# Patient Record
Sex: FEMALE | Race: BLACK OR AFRICAN AMERICAN | NOT HISPANIC OR LATINO | Employment: FULL TIME | URBAN - METROPOLITAN AREA
[De-identification: names, ages, dates, MRNs, and addresses within clinical notes are randomized per-mention and may not be internally consistent; named-entity substitution may affect disease eponyms.]

---

## 2024-09-11 ENCOUNTER — APPOINTMENT (OUTPATIENT)
Dept: CARDIOLOGY | Facility: HOSPITAL | Age: 54
End: 2024-09-11
Payer: COMMERCIAL

## 2024-09-11 ENCOUNTER — APPOINTMENT (OUTPATIENT)
Dept: RADIOLOGY | Facility: HOSPITAL | Age: 54
End: 2024-09-11
Payer: COMMERCIAL

## 2024-09-11 ENCOUNTER — HOSPITAL ENCOUNTER (EMERGENCY)
Facility: HOSPITAL | Age: 54
Discharge: HOME | End: 2024-09-11
Attending: EMERGENCY MEDICINE
Payer: COMMERCIAL

## 2024-09-11 VITALS
TEMPERATURE: 96.9 F | DIASTOLIC BLOOD PRESSURE: 80 MMHG | RESPIRATION RATE: 18 BRPM | OXYGEN SATURATION: 100 % | SYSTOLIC BLOOD PRESSURE: 135 MMHG | HEART RATE: 73 BPM | BODY MASS INDEX: 34.91 KG/M2 | WEIGHT: 197 LBS | HEIGHT: 63 IN

## 2024-09-11 DIAGNOSIS — R07.9 CHEST PAIN, UNSPECIFIED TYPE: ICD-10-CM

## 2024-09-11 DIAGNOSIS — R42 DIZZINESS: Primary | ICD-10-CM

## 2024-09-11 LAB
ALBUMIN SERPL BCP-MCNC: 4 G/DL (ref 3.4–5)
ALP SERPL-CCNC: 75 U/L (ref 33–110)
ALT SERPL W P-5'-P-CCNC: 9 U/L (ref 7–45)
ANION GAP SERPL CALC-SCNC: 8 MMOL/L (ref 10–20)
AST SERPL W P-5'-P-CCNC: 11 U/L (ref 9–39)
BASOPHILS # BLD AUTO: 0.03 X10*3/UL (ref 0–0.1)
BASOPHILS NFR BLD AUTO: 0.6 %
BILIRUB SERPL-MCNC: 0.4 MG/DL (ref 0–1.2)
BUN SERPL-MCNC: 15 MG/DL (ref 6–23)
CALCIUM SERPL-MCNC: 9.6 MG/DL (ref 8.6–10.3)
CARDIAC TROPONIN I PNL SERPL HS: 3 NG/L (ref 0–13)
CARDIAC TROPONIN I PNL SERPL HS: <3 NG/L (ref 0–13)
CHLORIDE SERPL-SCNC: 105 MMOL/L (ref 98–107)
CO2 SERPL-SCNC: 30 MMOL/L (ref 21–32)
CREAT SERPL-MCNC: 0.53 MG/DL (ref 0.5–1.05)
EGFRCR SERPLBLD CKD-EPI 2021: >90 ML/MIN/1.73M*2
EOSINOPHIL # BLD AUTO: 0 X10*3/UL (ref 0–0.7)
EOSINOPHIL NFR BLD AUTO: 0 %
ERYTHROCYTE [DISTWIDTH] IN BLOOD BY AUTOMATED COUNT: 14.8 % (ref 11.5–14.5)
FLUAV RNA RESP QL NAA+PROBE: NOT DETECTED
FLUBV RNA RESP QL NAA+PROBE: NOT DETECTED
GLUCOSE SERPL-MCNC: 95 MG/DL (ref 74–99)
HCT VFR BLD AUTO: 40.3 % (ref 36–46)
HGB BLD-MCNC: 12.7 G/DL (ref 12–16)
IMM GRANULOCYTES # BLD AUTO: 0.01 X10*3/UL (ref 0–0.7)
IMM GRANULOCYTES NFR BLD AUTO: 0.2 % (ref 0–0.9)
LIPASE SERPL-CCNC: 13 U/L (ref 9–82)
LYMPHOCYTES # BLD AUTO: 1.59 X10*3/UL (ref 1.2–4.8)
LYMPHOCYTES NFR BLD AUTO: 31.9 %
MAGNESIUM SERPL-MCNC: 2 MG/DL (ref 1.6–2.4)
MCH RBC QN AUTO: 28.2 PG (ref 26–34)
MCHC RBC AUTO-ENTMCNC: 31.5 G/DL (ref 32–36)
MCV RBC AUTO: 90 FL (ref 80–100)
MONOCYTES # BLD AUTO: 0.29 X10*3/UL (ref 0.1–1)
MONOCYTES NFR BLD AUTO: 5.8 %
NEUTROPHILS # BLD AUTO: 3.07 X10*3/UL (ref 1.2–7.7)
NEUTROPHILS NFR BLD AUTO: 61.5 %
NRBC BLD-RTO: 0 /100 WBCS (ref 0–0)
PLATELET # BLD AUTO: 340 X10*3/UL (ref 150–450)
POTASSIUM SERPL-SCNC: 3.9 MMOL/L (ref 3.5–5.3)
PROT SERPL-MCNC: 7.6 G/DL (ref 6.4–8.2)
RBC # BLD AUTO: 4.5 X10*6/UL (ref 4–5.2)
SARS-COV-2 RNA RESP QL NAA+PROBE: NOT DETECTED
SODIUM SERPL-SCNC: 139 MMOL/L (ref 136–145)
WBC # BLD AUTO: 5 X10*3/UL (ref 4.4–11.3)

## 2024-09-11 PROCEDURE — 99283 EMERGENCY DEPT VISIT LOW MDM: CPT | Mod: 25

## 2024-09-11 PROCEDURE — 85025 COMPLETE CBC W/AUTO DIFF WBC: CPT

## 2024-09-11 PROCEDURE — 96361 HYDRATE IV INFUSION ADD-ON: CPT

## 2024-09-11 PROCEDURE — 93005 ELECTROCARDIOGRAM TRACING: CPT

## 2024-09-11 PROCEDURE — 71046 X-RAY EXAM CHEST 2 VIEWS: CPT | Mod: FOREIGN READ | Performed by: RADIOLOGY

## 2024-09-11 PROCEDURE — 96360 HYDRATION IV INFUSION INIT: CPT

## 2024-09-11 PROCEDURE — 93005 ELECTROCARDIOGRAM TRACING: CPT | Mod: 59

## 2024-09-11 PROCEDURE — 2500000004 HC RX 250 GENERAL PHARMACY W/ HCPCS (ALT 636 FOR OP/ED)

## 2024-09-11 PROCEDURE — 83690 ASSAY OF LIPASE: CPT

## 2024-09-11 PROCEDURE — 84484 ASSAY OF TROPONIN QUANT: CPT

## 2024-09-11 PROCEDURE — 71046 X-RAY EXAM CHEST 2 VIEWS: CPT

## 2024-09-11 PROCEDURE — 80053 COMPREHEN METABOLIC PANEL: CPT

## 2024-09-11 PROCEDURE — 36415 COLL VENOUS BLD VENIPUNCTURE: CPT

## 2024-09-11 PROCEDURE — 83735 ASSAY OF MAGNESIUM: CPT

## 2024-09-11 PROCEDURE — 84484 ASSAY OF TROPONIN QUANT: CPT | Mod: 91

## 2024-09-11 PROCEDURE — 2500000001 HC RX 250 WO HCPCS SELF ADMINISTERED DRUGS (ALT 637 FOR MEDICARE OP)

## 2024-09-11 PROCEDURE — 87636 SARSCOV2 & INF A&B AMP PRB: CPT

## 2024-09-11 RX ORDER — MECLIZINE HYDROCHLORIDE 25 MG/1
25 TABLET ORAL ONCE
Status: COMPLETED | OUTPATIENT
Start: 2024-09-11 | End: 2024-09-11

## 2024-09-11 RX ORDER — MECLIZINE HYDROCHLORIDE 25 MG/1
25 TABLET ORAL 3 TIMES DAILY PRN
Qty: 15 TABLET | Refills: 0 | Status: SHIPPED | OUTPATIENT
Start: 2024-09-11 | End: 2024-09-18

## 2024-09-11 RX ORDER — ACETAMINOPHEN 325 MG/1
975 TABLET ORAL ONCE
Status: COMPLETED | OUTPATIENT
Start: 2024-09-11 | End: 2024-09-11

## 2024-09-11 ASSESSMENT — PAIN DESCRIPTION - ORIENTATION: ORIENTATION: LEFT;UPPER

## 2024-09-11 ASSESSMENT — PAIN SCALES - GENERAL
PAINLEVEL_OUTOF10: 3
PAINLEVEL_OUTOF10: 4
PAINLEVEL_OUTOF10: 4

## 2024-09-11 ASSESSMENT — PAIN DESCRIPTION - LOCATION
LOCATION: CHEST

## 2024-09-11 ASSESSMENT — PAIN DESCRIPTION - DESCRIPTORS: DESCRIPTORS: DISCOMFORT;TIGHTNESS

## 2024-09-11 ASSESSMENT — COLUMBIA-SUICIDE SEVERITY RATING SCALE - C-SSRS
6. HAVE YOU EVER DONE ANYTHING, STARTED TO DO ANYTHING, OR PREPARED TO DO ANYTHING TO END YOUR LIFE?: NO
1. IN THE PAST MONTH, HAVE YOU WISHED YOU WERE DEAD OR WISHED YOU COULD GO TO SLEEP AND NOT WAKE UP?: NO
2. HAVE YOU ACTUALLY HAD ANY THOUGHTS OF KILLING YOURSELF?: NO

## 2024-09-11 ASSESSMENT — PAIN DESCRIPTION - PAIN TYPE: TYPE: ACUTE PAIN

## 2024-09-11 ASSESSMENT — PAIN - FUNCTIONAL ASSESSMENT: PAIN_FUNCTIONAL_ASSESSMENT: 0-10

## 2024-09-11 NOTE — Clinical Note
Betty SajiJuan was seen and treated in our emergency department on 9/11/2024.  She may return to work on 09/14/2024.       If you have any questions or concerns, please don't hesitate to call.      Nelly John PA-C

## 2024-09-11 NOTE — ED PROVIDER NOTES
HPI   Chief Complaint   Patient presents with    Chest Pain       HPI  HISTORY OF PRESENT ILLNESS:  53 y.o. female presenting to the ED with complaint of dizziness and mild chest pain that began at night.  Patient states that she began feeling dizzy yesterday, she describes it as a room spinning sensation, worse with head movement such as rotation of the head, worse with position changes as well.  Better when she sits still.  She states that she was so dizzy last night that she became nauseous and had 1 episode of emesis.  Not currently feeling nauseous today.  She states the dizziness is somewhat improved today, but still present, and could not go to work today due to the dizziness, so she came to the ED.  She also reports mild bilateral earache, no hearing change, no bleeding or drainage from the ears.  No headache.  No blurry vision or vision loss.  No extremity numbness, tingling, or weakness.  No difficulty with speech or swallowing.  No gait instability.  She also reports mild left upper chest pain, describes it as a mild discomfort, states it is sore and slightly tender.  She states that this pain is nearly resolved today, she states that she think she is anxious and thinking about it too much, but has minimal left upper chest pain that does not radiate.  Is not pleuritic, not worsened with deep breaths.  Not worsened with exertion or activity such as walking distances or climbing stairs.  Not worsened or improved with position changes such as sitting forward or lying back flat.  She denies shortness of breath.  No cough or hemoptysis.  States that she recently was sick for about 2 weeks with nasal congestion, rhinorrhea, and a nonproductive cough, but this improved in the past 5 to 7 days and she no longer has the symptoms.  She denies any fevers or chills.  No headache, neck pain or stiffness.  No abdominal pain.  No diarrhea.  No back or flank pain.  She states that she has chronic swelling of her lower  extremities but is unchanged from baseline.  No lower extremity pain, no calf tenderness.  No medications taken today for her symptoms.  No other complaints or symptoms voiced    12 point review of systems was performed and is negative unless otherwise specified in HPI.    PMH: obesity, chronic peripheral edema  Social history: non smoker, no ETOH, no illicit substances  Physical Exam   ED Triage Vitals [09/11/24 0750]   Temperature Heart Rate Respirations BP   36.1 °C (96.9 °F) 63 18 136/79      Pulse Ox Temp Source Heart Rate Source Patient Position   100 % Temporal -- --      BP Location FiO2 (%)     -- --       Physical Exam  Constitutional:       General: She is not in acute distress.     Appearance: She is not ill-appearing, toxic-appearing or diaphoretic.   HENT:      Head: Normocephalic and atraumatic.      Comments: +dizziness reproduced with head rotation, relatively mild.   Normal bilateral ear canals and tympanic membranes, no cerumen impaction, no erythema or bulging of the tympanic membranes bilaterally, no mastoid tenderness bilaterally.  No nasal congestion or sinus tenderness bilaterally.  No pharyngeal erythema.     Nose: No congestion.      Mouth/Throat:      Mouth: Mucous membranes are moist.   Eyes:      General: No visual field deficit or scleral icterus.     Extraocular Movements: Extraocular movements intact.      Right eye: Normal extraocular motion.      Left eye: Normal extraocular motion.      Pupils: Pupils are equal, round, and reactive to light.   Neck:      Vascular: No JVD.   Cardiovascular:      Rate and Rhythm: Normal rate and regular rhythm.      Pulses: Normal pulses.           Radial pulses are 2+ on the right side and 2+ on the left side.      Heart sounds: Normal heart sounds.   Pulmonary:      Effort: Pulmonary effort is normal. No tachypnea or respiratory distress.      Breath sounds: Normal breath sounds. No stridor. No decreased breath sounds, wheezing, rhonchi or rales.    Chest:      Chest wall: Tenderness present.      Comments: +reproducible tenderness over the left mid parasternal border/costochondral junction, no overlying skin changes, no erythema or ecchymosis, no step-offs or crepitus, no subcutaneous emphysema.  No zoster rash.  Abdominal:      General: There is no distension.      Palpations: Abdomen is soft.      Tenderness: There is no abdominal tenderness. There is no guarding.   Musculoskeletal:         General: No tenderness. Normal range of motion.      Cervical back: Normal range of motion and neck supple. No rigidity.      Right lower leg: No tenderness.      Left lower leg: No tenderness.      Comments: Symmetric 1+ bilateral edema at baseline per patient, some chronic skin changes noted, no cellulitic changes, no erythema or warmth, nontender, neurovascularly intact distally, warm and well-perfused.   Skin:     General: Skin is warm and dry.      Capillary Refill: Capillary refill takes less than 2 seconds.   Neurological:      General: No focal deficit present.      Mental Status: She is alert and oriented to person, place, and time.      GCS: GCS eye subscore is 4. GCS verbal subscore is 5. GCS motor subscore is 6.      Cranial Nerves: Cranial nerves 2-12 are intact. No cranial nerve deficit, dysarthria or facial asymmetry.      Sensory: Sensation is intact. No sensory deficit.      Motor: Motor function is intact. No weakness or abnormal muscle tone.      Coordination: Coordination is intact. Finger-Nose-Finger Test normal.      Gait: Gait is intact. Gait normal.   Psychiatric:         Mood and Affect: Mood normal.         Behavior: Behavior normal.         Judgment: Judgment normal.     ED Course & MDM   ED Course as of 09/11/24 1055   Wed Sep 11, 2024   0902 07:52 12 lead EKG interpreted by myself and my ED attending reveals sinus rhythm with a rate of 61 beats per minute.  Normal Axis.  There are no ST elevations or T wave inversions. No acute ischemic  changes identified.  [EH]      ED Course User Index  [EH] Nelly John PA-C         Diagnoses as of 09/11/24 1055   Dizziness   Chest pain, unspecified type     Medical Decision Making  ED course / MDM     Summary:  Patient presented with dizziness, chest pain, and an episode of vomiting.  Dizziness described as room spinning, worse with head movements.  Minimal chest pain today.  Vital signs stable, patient overall well-appearing, ambulates unassisted, no acute distress.  On exam, there is reproducible tenderness over the left chest wall and left mid parasternal border, lungs clear to auscultation, heart regular rate and rhythm.  Mild symmetric bilateral lower extremity edema at baseline per patient.  Unremarkable neurologic exam, no motor or sensory deficits of the upper or lower extremities, some reproducibility of dizziness with head rotation.  Abdomen soft and nontender.  IV established, labs drawn.  Cardiac workup initiated.  Labs are reassuring, largely normal including CBC, CMP, lipase, COVID and influenza swabs.  EKG is nonischemic, troponin is negative x 2.  Chest x-ray shows no acute process.  Patient was given Tylenol, IV fluids, and meclizine in the ED.  On reevaluation, feels improved.  No longer dizzy.  Patient case discussed with ED attending Dr. Rivera, who also saw and evaluated the patient. Results and differential were discussed in detail with the patient.  Dizziness appears vertiginous given reproducibility with head movements.  She ambulates unassisted with a steady gait, has no motor or sensory deficits, no evidence of a central cause of dizziness.  Chest pain reproducible and is mild, cardiac workup negative today.  Has no pleuritic pain, no shortness of breath, no hypoxia or tachycardia, very low suspicion for PE.  Did discuss the option for admission for chest pain rule out, patient feels comfortable with the plan for discharge with close outpatient follow-up.  She is eager for discharge.   Prescription sent for meclizine, and she was advised to follow-up with her PCP within the next 1 week.Patient was given strict return precautions, understands reasons to return to the ED. Also discussed supportive care instructions. I expressed the importance of outpatient follow up with their PCP. All questions were answered, patient expressed understanding and stated that they would comply.    Impression:  1. See diagnosis     Disposition: Discharge    Patient seen and discussed with Dr. Rivera    This note has been transcribed using voice recognition and may contain grammatical errors, misplaced words, incorrect words, incorrect phrases or other errors.   Procedure  Procedures     Nelly John PA-C  09/11/24 1119

## 2024-09-11 NOTE — ED TRIAGE NOTES
Pt to ED from work for intermittent CP that began last night. Pt states she became dizzy yesterday morning. Pt states she had one episode of emesis last night. Pt states pain is upper left chest and non-radiating. Pt states she has bilateral ear pain.

## 2024-09-14 LAB
ATRIAL RATE: 61 BPM
ATRIAL RATE: 75 BPM
P AXIS: 60 DEGREES
P AXIS: 94 DEGREES
P OFFSET: 176 MS
P OFFSET: 201 MS
P ONSET: 145 MS
P ONSET: 147 MS
PR INTERVAL: 134 MS
PR INTERVAL: 144 MS
Q ONSET: 212 MS
Q ONSET: 219 MS
QRS COUNT: 10 BEATS
QRS COUNT: 13 BEATS
QRS DURATION: 80 MS
QRS DURATION: 86 MS
QT INTERVAL: 388 MS
QT INTERVAL: 406 MS
QTC CALCULATION(BAZETT): 408 MS
QTC CALCULATION(BAZETT): 433 MS
QTC FREDERICIA: 408 MS
QTC FREDERICIA: 417 MS
R AXIS: 28 DEGREES
R AXIS: 33 DEGREES
T AXIS: 22 DEGREES
T AXIS: 44 DEGREES
T OFFSET: 413 MS
T OFFSET: 415 MS
VENTRICULAR RATE: 61 BPM
VENTRICULAR RATE: 75 BPM